# Patient Record
(demographics unavailable — no encounter records)

---

## 2024-10-14 NOTE — HISTORY OF PRESENT ILLNESS
[Right] : right hand dominant [FreeTextEntry1] : Patient presents for evaluation of right wrist pain status post fall while playing soccer.  Date of injury October 10, 2024.  It has been 4 days since injury.  Patient went to a [facility] MD where x-rays were taken.  Report states there is no acute fracture.  Patient presents in a volar resting splint today.  Patient has noticed slight improvement in symptoms.  Patient has taken ibuprofen as needed.

## 2024-10-14 NOTE — ASSESSMENT
[FreeTextEntry1] : Patient has a minimally displaced right distal radius buckle type fracture.  Discussed casting versus splinting.  They have chosen full-time splinting except hygiene.  Will see back in 3 and half weeks.

## 2024-10-14 NOTE — PHYSICAL EXAM
[de-identified] : Tender over right distal radius nontender scaphoid nontender distal ulna good pronation good supination no bruising or ecchymosis [de-identified] : PA lateral oblique x-rays taken today demonstrate a Salter-Mcpherson II fracture right distal radius.  Injury films viewed on patient's father's iPhone demonstrating similar fracture

## 2024-10-14 NOTE — CONSULT LETTER
[Dear  ___] : Dear  [unfilled], [Consult Letter:] : I had the pleasure of evaluating your patient, [unfilled]. [Please see my note below.] : Please see my note below. [Consult Closing:] : Thank you very much for allowing me to participate in the care of this patient.  If you have any questions, please do not hesitate to contact me. [Sincerely,] : Sincerely, [FreeTextEntry3] : David Cheung MD Co-Director [facility]

## 2024-11-05 NOTE — REVIEW OF SYSTEMS
[Right] : right [Joint Stiffness] : joint stiffness [Joint Pain] : joint pain [Negative] : Allergic/Immunologic

## 2024-11-06 NOTE — PHYSICAL EXAM
[de-identified] : Nontender distal radius full range of motion digital extensors and EPL intact [de-identified] : PA lateral oblique x-rays right wrist demonstrate a healing right distal radius fracture in good alignment

## 2024-11-06 NOTE — HISTORY OF PRESENT ILLNESS
[Right] : right hand dominant [FreeTextEntry1] : Date of injury October 10, 2024. 3 weeks 6 days s/p minimally displaced right distal radius buckle type fracture.  No new injury.  Patient states he has been compliant with splint.

## 2024-11-06 NOTE — ASSESSMENT
[FreeTextEntry1] : Patient can begin to wean the splint.  For return to full contact sports would recommend a week and a half of protection and they must let us know that he can fully weight-bear without symptoms.  He is interested in returning to soccer.  Discussed protective splinting while playing soccer.  Will administer a gladiator splint.  Father aware of refracture risk

## 2024-11-06 NOTE — PHYSICAL EXAM
[de-identified] : Nontender distal radius full range of motion digital extensors and EPL intact [de-identified] : PA lateral oblique x-rays right wrist demonstrate a healing right distal radius fracture in good alignment